# Patient Record
Sex: FEMALE | Race: BLACK OR AFRICAN AMERICAN | NOT HISPANIC OR LATINO | Employment: UNEMPLOYED | URBAN - METROPOLITAN AREA
[De-identification: names, ages, dates, MRNs, and addresses within clinical notes are randomized per-mention and may not be internally consistent; named-entity substitution may affect disease eponyms.]

---

## 2021-10-14 ENCOUNTER — NURSE TRIAGE (OUTPATIENT)
Dept: ADMINISTRATIVE | Facility: CLINIC | Age: 26
End: 2021-10-14

## 2022-01-26 ENCOUNTER — OFFICE VISIT (OUTPATIENT)
Dept: OBSTETRICS AND GYNECOLOGY | Facility: CLINIC | Age: 27
End: 2022-01-26
Payer: MEDICAID

## 2022-01-26 VITALS
HEIGHT: 65 IN | WEIGHT: 120.13 LBS | SYSTOLIC BLOOD PRESSURE: 118 MMHG | DIASTOLIC BLOOD PRESSURE: 72 MMHG | BODY MASS INDEX: 20.01 KG/M2

## 2022-01-26 DIAGNOSIS — Z32.02 NEGATIVE PREGNANCY TEST: ICD-10-CM

## 2022-01-26 DIAGNOSIS — Z01.419 WELL WOMAN EXAM: Primary | ICD-10-CM

## 2022-01-26 DIAGNOSIS — R11.2 NON-INTRACTABLE VOMITING WITH NAUSEA, UNSPECIFIED VOMITING TYPE: ICD-10-CM

## 2022-01-26 LAB
B-HCG UR QL: NEGATIVE
CTP QC/QA: YES

## 2022-01-26 PROCEDURE — 99385 PREV VISIT NEW AGE 18-39: CPT | Mod: S$PBB,,, | Performed by: PHYSICIAN ASSISTANT

## 2022-01-26 PROCEDURE — 99385 PR PREVENTIVE VISIT,NEW,18-39: ICD-10-PCS | Mod: S$PBB,,, | Performed by: PHYSICIAN ASSISTANT

## 2022-01-26 PROCEDURE — 81025 URINE PREGNANCY TEST: CPT | Mod: PBBFAC | Performed by: PHYSICIAN ASSISTANT

## 2022-01-26 PROCEDURE — 3074F SYST BP LT 130 MM HG: CPT | Mod: CPTII,,, | Performed by: PHYSICIAN ASSISTANT

## 2022-01-26 PROCEDURE — 1159F MED LIST DOCD IN RCRD: CPT | Mod: CPTII,,, | Performed by: PHYSICIAN ASSISTANT

## 2022-01-26 PROCEDURE — 3008F PR BODY MASS INDEX (BMI) DOCUMENTED: ICD-10-PCS | Mod: CPTII,,, | Performed by: PHYSICIAN ASSISTANT

## 2022-01-26 PROCEDURE — 1160F RVW MEDS BY RX/DR IN RCRD: CPT | Mod: CPTII,,, | Performed by: PHYSICIAN ASSISTANT

## 2022-01-26 PROCEDURE — 3078F DIAST BP <80 MM HG: CPT | Mod: CPTII,,, | Performed by: PHYSICIAN ASSISTANT

## 2022-01-26 PROCEDURE — 99213 OFFICE O/P EST LOW 20 MIN: CPT | Mod: PBBFAC | Performed by: PHYSICIAN ASSISTANT

## 2022-01-26 PROCEDURE — 1160F PR REVIEW ALL MEDS BY PRESCRIBER/CLIN PHARMACIST DOCUMENTED: ICD-10-PCS | Mod: CPTII,,, | Performed by: PHYSICIAN ASSISTANT

## 2022-01-26 PROCEDURE — 3008F BODY MASS INDEX DOCD: CPT | Mod: CPTII,,, | Performed by: PHYSICIAN ASSISTANT

## 2022-01-26 PROCEDURE — 99999 PR PBB SHADOW E&M-EST. PATIENT-LVL III: ICD-10-PCS | Mod: PBBFAC,,, | Performed by: PHYSICIAN ASSISTANT

## 2022-01-26 PROCEDURE — 1159F PR MEDICATION LIST DOCUMENTED IN MEDICAL RECORD: ICD-10-PCS | Mod: CPTII,,, | Performed by: PHYSICIAN ASSISTANT

## 2022-01-26 PROCEDURE — 3074F PR MOST RECENT SYSTOLIC BLOOD PRESSURE < 130 MM HG: ICD-10-PCS | Mod: CPTII,,, | Performed by: PHYSICIAN ASSISTANT

## 2022-01-26 PROCEDURE — 3078F PR MOST RECENT DIASTOLIC BLOOD PRESSURE < 80 MM HG: ICD-10-PCS | Mod: CPTII,,, | Performed by: PHYSICIAN ASSISTANT

## 2022-01-26 PROCEDURE — 99999 PR PBB SHADOW E&M-EST. PATIENT-LVL III: CPT | Mod: PBBFAC,,, | Performed by: PHYSICIAN ASSISTANT

## 2022-01-26 RX ORDER — ERGOCALCIFEROL 1.25 MG/1
CAPSULE ORAL
COMMUNITY

## 2022-01-26 NOTE — PROGRESS NOTES
Subjective:       Patient ID: Rock Zaman is a 26 y.o. female.    Chief Complaint   Patient presents with    painful cycles       History of Present Illness:    Rock Zaman is a  26 y.o. woman for concerns about vomiting on the first day of her menstrual cycle.    Annual Exam-Premenopausal  Patient presents for annual exam.  The patient has never been sexually active. GYN screening history: no prior history of gyn screening tests. The patient reports that there is not domestic violence in her life.    Patient complains of vomiting 2-3 times on the first day of her cycle every month. Patient states that the vomiting episodes have been occurring since .  She is reports her menses occur every 25-26 days and menses last for 6 days. She changes her pad every 1-2 hours.. Dysmenorrhea:moderate, occurring first 1-2 days of flow. Cyclic symptoms include: vomiting. Current contraception: none/not sexually active.    Covid vaccine status: Not vaccinated  Flu vaccine status: Not vaccinated  Gardasil vaccine status: Vaccinated    Menstrual History: menarche age 14, reports periods occur ever 25-26 days and menses last for 6 days. Patient reports moderate dysmennorrhea at the beginning of her cycle. Patient reports vomiting on the first day of her cycle every month    Obstetric Hx:   STD/STI Hx: No known history  Contraception Hx: No history of contraceptive use      GYN & OB History  Patient's last menstrual period was 2022.   Date of last PAP: No history of pap    OB History    Para Term  AB Living   0 0 0 0 0 0   SAB IAB Ectopic Multiple Live Births   0 0 0 0 0        History reviewed. No pertinent past medical history.     History reviewed. No pertinent surgical history.     Social History     Socioeconomic History    Marital status: Single   Tobacco Use    Smoking status: Never Smoker    Smokeless tobacco: Never Used   Substance and Sexual Activity    Alcohol use: Yes     Drug use: Not Currently    Sexual activity: Never     Partners: Male     Birth control/protection: None        Family History   Problem Relation Age of Onset    Breast cancer Neg Hx     Ovarian cancer Neg Hx           ROS:  GENERAL: Feeling well overall. Denies fever or chills.   SKIN: Denies rash or lesions.   HEAD: Denies head injury or headache.   NODES: Denies enlarged lymph nodes.   CHEST: Denies chest pain or shortness of breath.   CARDIOVASCULAR: Denies palpitations or left sided chest pain.   ABDOMEN: No abdominal pain, diarrhea, nausea, vomiting or rectal bleeding. Patient reports constipation and symptoms of GERD. Nausea and vomiting while on menstrual cycle.  URINARY: No dysuria, hematuria, or burning on urination.  REPRODUCTIVE: See HPI.   BREASTS: Denies pain, lumps, or nipple discharge.   HEMATOLOGIC: No easy bruisability or excessive bleeding.   MUSCULOSKELETAL: Denies joint pain or swelling.   NEUROLOGIC: Denies syncope or weakness.   PSYCHIATRIC: Denies depression, anxiety or mood swings.      Objective:     PE:   APPEARANCE: Well nourished, well developed female in no acute distress.  NODES: no cervical, supraclavicular, or inguinal lymphadenopathy  BREASTS: Symmetrical, no skin changes or visible lesions. No palpable masses, nipple discharge or adenopathy bilaterally.  ABDOMEN: Soft. No tenderness or masses. No distention. No hernias palpated. No CVA tenderness.  VULVA: No lesions. Normal external female genitalia.  URETHRAL MEATUS: Normal size and location, no lesions, no prolapse.  URETHRA: No masses, tenderness, or prolapse.  VAGINA: Moist. No lesions or lacerations noted. No abnormal discharge present. No odor present.   ANUS PERINEUM: Normal.  SKIN: No rashes, lesions, ulcers, acne, hirsutism.  RESP: Normal respiratory effort.  MENTAL STATUS: Alert, oriented x 3, normal affect and mood.    Assessment:     1. Negative pregnancy test    2. Non-intractable vomiting with nausea,  unspecified vomiting type         Office Visit on 01/26/2022   Component Date Value Ref Range Status    POC Preg Test, Ur 01/26/2022 Negative  Negative Final     Acceptable 01/26/2022 Yes   Final       Plan:     Negative pregnancy test  -     POCT urine pregnancy    Non-intractable vomiting with nausea, unspecified vomiting type    Other orders  -     Cancel: Liquid-Based Pap Smear, Screening  -     Cancel: HPV High Risk Genotypes, PCR        Well Woman:    - Pap smear: Attempted to collect PAP smear - patient unable to tolerate exam - pt has never been sexually active. She would prefer to defer PAP to next year.  - Birth control: none  - GC/CT: Pt declined.   - Affirm: Pt declined.   - RPR/HIV/Hep B: Pt declined.   - Mammogram: N/A  - Smoking cessation: N/A  - Vaccines: patient unvaccinated    Discussed options to control vomiting and dysmenorrhea with patient, including naproxen and OCPs. Patient instructed to take naproxen 3-4 days before the beginning of her cycle and during her cycle to see if this resolves dysmenorrhea and vomiting. Patient instructed to contact office if naproxen does not resolve symptoms to discuss starting OCPs.     Attempted pelvic exam, however, the patient has never been sexually active and unable to tolerate exam. Recommended that patient return for pelvic exam and Pap screening in one year.        Patient was counseled today on the new ACS guidelines for cervical cytology screening as well as the current recommendations for breast cancer screening. She was counseled to follow up with her PCP for other routine health maintenance.     F/u in 1 year or sooner PRN.    Chloe Newell PA-C